# Patient Record
(demographics unavailable — no encounter records)

---

## 2025-04-25 NOTE — HISTORY OF PRESENT ILLNESS
[FreeTextEntry1] : 34F no AC/AP or no pmh p/f fall on face while taking a video. CTH at hospital with ?tiny L frontal SAH vs. artifact and ? subdural small in anterior falx, 4hr repeat CTH was stable. Pt returned for follow up with a new scan. Pt report no symptoms related to this. Had some pain in the initial days, but everything resolved in few days. 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

## 2025-04-25 NOTE — RESULTS/DATA
[FreeTextEntry1] :  ACC: [id number] EXAM: CT 3D RECONSTRUCT W GISELL ORDERED BY: PACO GARCIA  ACC: [id number] EXAM: CT MAXILLOFACIAL ORDERED BY: PACO GARCIA  ACC: [id number] EXAM: CT BRAIN ORDERED BY: PACO GARCIA  *** ADDENDUM # 1 ***  There is a minimally displaced left nasal bone fracture.  --- End of Report ---  *** END OF ADDENDUM # 1 ***   PROCEDURE DATE: 03/08/2025    INTERPRETATION: CLINICAL INFORMATION: head injury  COMPARISON: None.  CONTRAST: IV Contrast: Omnipaque 350 (accession [id number]), NONE (accession [id number]), Omnipaque 350 (accession [id number]) 90 cc administered 10 cc discarded .  TECHNIQUE:  CT BRAIN: Serial axial images were obtained from the skull base to the vertex using multi-slice helical technique. Sagittal and coronal reformats were obtained.  CT MAXILLOFACIAL: Serial axial thin section images were obtained from the top of the frontal sinuses through the bottom of the mandible utilizing multi-slice helical technique. Reformatted coronal and sagittal images were obtained.  FINDINGS:  CT BRAIN:  VENTRICLES AND SULCI: Normal in size and configuration. INTRA-AXIAL: No mass effect, acute hemorrhage, or midline shift. EXTRA-AXIAL: No mass or fluid collection. Basal cisterns are normal in appearance. Asymmetric hyperdensity in the high left frontal sulcus (302:41), artifact versus tiny subarachnoid hemorrhage. Minimal hyperdensity along the right cerebral falx (for example 601:62), prominent vessel versus tiny subdural hemorrhage. CALVARIUM: Intact.  MISCELLANEOUS: None.   CT MAXILLOFACIAL:  FACIAL BONES/MAXILLA: Nasal bone and anterior nasal spine are intact. Orbital walls and orbital floors are intact. Bilateral zygomatic arches are intact. Medial and lateral pterygoid plates are intact. MANDIBLE: No fracture. DENTITION: No avulsion or fracture.  SINONASAL CAVITIES: Clear. Bony nasal septum is intact. TYMPANOMASTOID CAVITIES: Clear. Visualized temporal bones are intact.  ORBITAL CONTENTS: Normal. No retrobulbar mass or hematoma. Optic nerve sheaths and extraocular muscles are relatively symmetric and normal in appearance. REMAINING VISUALIZED BONES: Intact. MISCELLANEOUS: None.    IMPRESSION:  CT HEAD: Question minimal subarachnoid hemorrhage or artifact, frontal convexity. Minimal subdural hemorrhage along the falx versus prominent vessel.  If there is persistent clinical concern for acute intracranial hemorrhage, a repeat head CT can be obtained in 4 to 6 hours to assess for interval change or stability.  CT MAXILLOFACIAL: No acute fracture.    --- End of Report ---  ***Please see the addendum at the top of this report. It may contain additional important information or changes.****

## 2025-04-25 NOTE — ASSESSMENT
[FreeTextEntry1] : IMPRESSION  34F no AC/AP or no pmh p/f fall on face while taking a video. CTH at hospital with ?tiny L frontal SAH vs. artifact and ? anterior falx SDH, 4hr repeat CTH was stable. Pt report no symptoms related to this. Had some pain in the initial days, but everything resolved in few days. Currently working FT with no issues. New CT head from 4/21/25 with no hemorrhage or other findings. Pt neurologically intact.  PLAN Precautions given Can return to normal activities Return as needed.

## 2025-04-25 NOTE — PHYSICAL EXAM
[General Appearance - Alert] : alert [General Appearance - In No Acute Distress] : in no acute distress [General Appearance - Well Nourished] : well nourished [General Appearance - Well-Appearing] : healthy appearing [Oriented To Time, Place, And Person] : oriented to person, place, and time [Impaired Insight] : insight and judgment were intact [Affect] : the affect was normal [Memory Recent] : recent memory was not impaired [Person] : oriented to person [Place] : oriented to place [Time] : oriented to time [Short Term Intact] : short term memory intact [I: Normal Smell] : smell intact bilaterally [Cranial Nerves Optic (II)] : visual acuity intact bilaterally,  pupils equal round and reactive to light [Cranial Nerves Oculomotor (III)] : extraocular motion intact [Cranial Nerves Facial (VII)] : face symmetrical [Cranial Nerves Vestibulocochlear (VIII)] : hearing was intact bilaterally [Cranial Nerves Glossopharyngeal (IX)] : tongue and palate midline [Cranial Nerves Accessory (XI - Cranial And Spinal)] : head turning and shoulder shrug symmetric [Motor Tone] : muscle tone was normal in all four extremities [Abnormal Walk] : normal gait [Sclera] : the sclera and conjunctiva were normal [PERRL With Normal Accommodation] : pupils were equal in size, round, reactive to light, with normal accommodation [Outer Ear] : the ears and nose were normal in appearance [Neck Appearance] : the appearance of the neck was normal [] : no respiratory distress [Heart Rate And Rhythm] : heart rate was normal and rhythm regular [No CVA Tenderness] : no ~M costovertebral angle tenderness [No Spinal Tenderness] : no spinal tenderness [Involuntary Movements] : no involuntary movements were seen [FreeTextEntry8] : no drift, no instability at tandem